# Patient Record
Sex: FEMALE | Race: WHITE | NOT HISPANIC OR LATINO
[De-identification: names, ages, dates, MRNs, and addresses within clinical notes are randomized per-mention and may not be internally consistent; named-entity substitution may affect disease eponyms.]

---

## 2017-05-30 ENCOUNTER — APPOINTMENT (OUTPATIENT)
Dept: ENDOCRINOLOGY | Facility: CLINIC | Age: 57
End: 2017-05-30

## 2017-06-20 ENCOUNTER — APPOINTMENT (OUTPATIENT)
Dept: GASTROENTEROLOGY | Facility: CLINIC | Age: 57
End: 2017-06-20

## 2017-06-20 VITALS
WEIGHT: 182 LBS | HEART RATE: 57 BPM | OXYGEN SATURATION: 98 % | DIASTOLIC BLOOD PRESSURE: 70 MMHG | HEIGHT: 66 IN | SYSTOLIC BLOOD PRESSURE: 114 MMHG | BODY MASS INDEX: 29.25 KG/M2 | RESPIRATION RATE: 14 BRPM

## 2017-06-20 DIAGNOSIS — R14.0 ABDOMINAL DISTENSION (GASEOUS): ICD-10-CM

## 2017-06-29 ENCOUNTER — APPOINTMENT (OUTPATIENT)
Dept: ULTRASOUND IMAGING | Facility: CLINIC | Age: 57
End: 2017-06-29

## 2017-07-16 ENCOUNTER — FORM ENCOUNTER (OUTPATIENT)
Age: 57
End: 2017-07-16

## 2017-07-17 ENCOUNTER — OUTPATIENT (OUTPATIENT)
Dept: OUTPATIENT SERVICES | Facility: HOSPITAL | Age: 57
LOS: 1 days | End: 2017-07-17
Payer: MEDICAID

## 2017-07-17 ENCOUNTER — APPOINTMENT (OUTPATIENT)
Dept: ULTRASOUND IMAGING | Facility: CLINIC | Age: 57
End: 2017-07-17

## 2017-07-17 DIAGNOSIS — Z90.13 ACQUIRED ABSENCE OF BILATERAL BREASTS AND NIPPLES: Chronic | ICD-10-CM

## 2017-07-17 DIAGNOSIS — Z98.89 OTHER SPECIFIED POSTPROCEDURAL STATES: Chronic | ICD-10-CM

## 2017-07-17 DIAGNOSIS — R14.0 ABDOMINAL DISTENSION (GASEOUS): ICD-10-CM

## 2017-07-17 DIAGNOSIS — Z90.49 ACQUIRED ABSENCE OF OTHER SPECIFIED PARTS OF DIGESTIVE TRACT: Chronic | ICD-10-CM

## 2017-07-17 PROCEDURE — 76700 US EXAM ABDOM COMPLETE: CPT

## 2017-07-19 ENCOUNTER — MESSAGE (OUTPATIENT)
Age: 57
End: 2017-07-19

## 2017-07-25 ENCOUNTER — APPOINTMENT (OUTPATIENT)
Dept: ENDOCRINOLOGY | Facility: CLINIC | Age: 57
End: 2017-07-25

## 2017-07-25 VITALS
HEIGHT: 66 IN | HEART RATE: 75 BPM | WEIGHT: 180 LBS | SYSTOLIC BLOOD PRESSURE: 120 MMHG | DIASTOLIC BLOOD PRESSURE: 80 MMHG | BODY MASS INDEX: 28.93 KG/M2 | OXYGEN SATURATION: 96 %

## 2017-07-25 RX ORDER — SODIUM SULFATE, POTASSIUM SULFATE, MAGNESIUM SULFATE 17.5; 3.13; 1.6 G/ML; G/ML; G/ML
17.5-3.13-1.6 SOLUTION, CONCENTRATE ORAL
Qty: 1 | Refills: 0 | Status: COMPLETED | COMMUNITY
Start: 2017-06-20 | End: 2017-07-25

## 2017-07-27 LAB
25(OH)D3 SERPL-MCNC: 22.2 NG/ML
ALBUMIN SERPL ELPH-MCNC: 4.5 G/DL
ALP BLD-CCNC: 73 U/L
ALT SERPL-CCNC: 7 U/L
ANION GAP SERPL CALC-SCNC: 17 MMOL/L
AST SERPL-CCNC: 16 U/L
BASOPHILS # BLD AUTO: 0.05 K/UL
BASOPHILS NFR BLD AUTO: 0.7 %
BILIRUB SERPL-MCNC: 0.5 MG/DL
BUN SERPL-MCNC: 14 MG/DL
CALCIUM SERPL-MCNC: 9.4 MG/DL
CHLORIDE SERPL-SCNC: 103 MMOL/L
CHOLEST SERPL-MCNC: 208 MG/DL
CHOLEST/HDLC SERPL: 3.4 RATIO
CO2 SERPL-SCNC: 22 MMOL/L
CREAT SERPL-MCNC: 0.8 MG/DL
EOSINOPHIL # BLD AUTO: 0.07 K/UL
EOSINOPHIL NFR BLD AUTO: 1 %
GLUCOSE SERPL-MCNC: 88 MG/DL
HBA1C MFR BLD HPLC: 5.2 %
HCT VFR BLD CALC: 42.2 %
HDLC SERPL-MCNC: 61 MG/DL
HGB BLD-MCNC: 13.5 G/DL
IMM GRANULOCYTES NFR BLD AUTO: 0.1 %
LDLC SERPL CALC-MCNC: 129 MG/DL
LYMPHOCYTES # BLD AUTO: 1.44 K/UL
LYMPHOCYTES NFR BLD AUTO: 21.3 %
MAN DIFF?: NORMAL
MCHC RBC-ENTMCNC: 28 PG
MCHC RBC-ENTMCNC: 32 GM/DL
MCV RBC AUTO: 87.6 FL
MONOCYTES # BLD AUTO: 0.41 K/UL
MONOCYTES NFR BLD AUTO: 6.1 %
NEUTROPHILS # BLD AUTO: 4.78 K/UL
NEUTROPHILS NFR BLD AUTO: 70.8 %
PLATELET # BLD AUTO: 263 K/UL
POTASSIUM SERPL-SCNC: 4.6 MMOL/L
PROT SERPL-MCNC: 6.7 G/DL
RBC # BLD: 4.82 M/UL
RBC # FLD: 13.8 %
SODIUM SERPL-SCNC: 142 MMOL/L
T4 FREE SERPL-MCNC: 1.3 NG/DL
THYROGLOB AB SERPL-ACNC: <20 IU/ML
THYROPEROXIDASE AB SERPL IA-ACNC: <10 IU/ML
TRIGL SERPL-MCNC: 88 MG/DL
TSH SERPL-ACNC: 1.81 UIU/ML
WBC # FLD AUTO: 6.76 K/UL

## 2017-07-31 LAB

## 2017-09-11 ENCOUNTER — MESSAGE (OUTPATIENT)
Age: 57
End: 2017-09-11

## 2017-09-11 DIAGNOSIS — R10.9 UNSPECIFIED ABDOMINAL PAIN: ICD-10-CM

## 2017-09-12 RX ORDER — POLYETHYLENE GLYCOL 3350 AND ELECTROLYTES WITH LEMON FLAVOR 236; 22.74; 6.74; 5.86; 2.97 G/4L; G/4L; G/4L; G/4L; G/4L
236 POWDER, FOR SOLUTION ORAL
Qty: 1 | Refills: 0 | Status: ACTIVE | COMMUNITY
Start: 2017-09-12 | End: 1900-01-01

## 2017-09-13 ENCOUNTER — APPOINTMENT (OUTPATIENT)
Dept: GASTROENTEROLOGY | Facility: AMBULATORY MEDICAL SERVICES | Age: 57
End: 2017-09-13
Payer: MEDICAID

## 2017-09-13 PROCEDURE — 43239 EGD BIOPSY SINGLE/MULTIPLE: CPT

## 2017-09-13 PROCEDURE — 45380 COLONOSCOPY AND BIOPSY: CPT

## 2017-10-18 ENCOUNTER — APPOINTMENT (OUTPATIENT)
Dept: OBGYN | Facility: CLINIC | Age: 57
End: 2017-10-18

## 2017-11-20 ENCOUNTER — APPOINTMENT (OUTPATIENT)
Dept: OBGYN | Facility: CLINIC | Age: 57
End: 2017-11-20

## 2018-01-02 ENCOUNTER — APPOINTMENT (OUTPATIENT)
Dept: ULTRASOUND IMAGING | Facility: CLINIC | Age: 58
End: 2018-01-02

## 2018-01-08 ENCOUNTER — APPOINTMENT (OUTPATIENT)
Dept: OTOLARYNGOLOGY | Facility: CLINIC | Age: 58
End: 2018-01-08

## 2018-01-16 ENCOUNTER — APPOINTMENT (OUTPATIENT)
Dept: OBGYN | Facility: CLINIC | Age: 58
End: 2018-01-16

## 2018-01-17 ENCOUNTER — APPOINTMENT (OUTPATIENT)
Dept: ULTRASOUND IMAGING | Facility: CLINIC | Age: 58
End: 2018-01-17
Payer: MEDICAID

## 2018-01-17 ENCOUNTER — APPOINTMENT (OUTPATIENT)
Dept: OBGYN | Facility: CLINIC | Age: 58
End: 2018-01-17

## 2018-01-17 ENCOUNTER — APPOINTMENT (OUTPATIENT)
Dept: OBGYN | Facility: CLINIC | Age: 58
End: 2018-01-17
Payer: MEDICAID

## 2018-01-17 ENCOUNTER — ASOB RESULT (OUTPATIENT)
Age: 58
End: 2018-01-17

## 2018-01-17 ENCOUNTER — OUTPATIENT (OUTPATIENT)
Dept: OUTPATIENT SERVICES | Facility: HOSPITAL | Age: 58
LOS: 1 days | End: 2018-01-17
Payer: MEDICAID

## 2018-01-17 VITALS
BODY MASS INDEX: 29.09 KG/M2 | SYSTOLIC BLOOD PRESSURE: 123 MMHG | WEIGHT: 181 LBS | DIASTOLIC BLOOD PRESSURE: 71 MMHG | HEIGHT: 66 IN | HEART RATE: 80 BPM

## 2018-01-17 DIAGNOSIS — Z90.13 ACQUIRED ABSENCE OF BILATERAL BREASTS AND NIPPLES: Chronic | ICD-10-CM

## 2018-01-17 DIAGNOSIS — Z98.89 OTHER SPECIFIED POSTPROCEDURAL STATES: Chronic | ICD-10-CM

## 2018-01-17 DIAGNOSIS — D05.10 INTRADUCTAL CARCINOMA IN SITU OF UNSPECIFIED BREAST: ICD-10-CM

## 2018-01-17 DIAGNOSIS — N95.2 POSTMENOPAUSAL ATROPHIC VAGINITIS: ICD-10-CM

## 2018-01-17 DIAGNOSIS — Z90.49 ACQUIRED ABSENCE OF OTHER SPECIFIED PARTS OF DIGESTIVE TRACT: Chronic | ICD-10-CM

## 2018-01-17 DIAGNOSIS — Z00.8 ENCOUNTER FOR OTHER GENERAL EXAMINATION: ICD-10-CM

## 2018-01-17 PROCEDURE — 76830 TRANSVAGINAL US NON-OB: CPT

## 2018-01-17 PROCEDURE — 76536 US EXAM OF HEAD AND NECK: CPT

## 2018-01-17 PROCEDURE — 76536 US EXAM OF HEAD AND NECK: CPT | Mod: 26

## 2018-01-17 PROCEDURE — 99386 PREV VISIT NEW AGE 40-64: CPT

## 2018-01-17 RX ORDER — LIDOCAINE HYDROCHLORIDE 20 MG/ML
2 SOLUTION OROPHARYNGEAL
Qty: 100 | Refills: 0 | Status: ACTIVE | COMMUNITY
Start: 2017-11-15

## 2018-01-17 RX ORDER — FLUCONAZOLE 100 MG/1
100 TABLET ORAL
Qty: 15 | Refills: 0 | Status: ACTIVE | COMMUNITY
Start: 2017-11-15

## 2018-01-18 ENCOUNTER — APPOINTMENT (OUTPATIENT)
Dept: OBGYN | Facility: CLINIC | Age: 58
End: 2018-01-18

## 2018-01-22 LAB
CYTOLOGY CVX/VAG DOC THIN PREP: NORMAL
HPV HIGH+LOW RISK DNA PNL CVX: NOT DETECTED

## 2018-01-23 ENCOUNTER — TRANSCRIPTION ENCOUNTER (OUTPATIENT)
Age: 58
End: 2018-01-23

## 2018-01-24 ENCOUNTER — OTHER (OUTPATIENT)
Age: 58
End: 2018-01-24

## 2018-04-10 ENCOUNTER — APPOINTMENT (OUTPATIENT)
Dept: OBGYN | Facility: CLINIC | Age: 58
End: 2018-04-10

## 2018-05-15 ENCOUNTER — APPOINTMENT (OUTPATIENT)
Dept: OBGYN | Facility: CLINIC | Age: 58
End: 2018-05-15

## 2018-06-19 ENCOUNTER — APPOINTMENT (OUTPATIENT)
Dept: ULTRASOUND IMAGING | Facility: CLINIC | Age: 58
End: 2018-06-19

## 2018-06-25 ENCOUNTER — APPOINTMENT (OUTPATIENT)
Dept: ULTRASOUND IMAGING | Facility: CLINIC | Age: 58
End: 2018-06-25

## 2018-07-30 PROBLEM — N95.2 POSTMENOPAUSAL ATROPHIC VAGINITIS: Status: ACTIVE | Noted: 2018-01-17

## 2018-08-03 ENCOUNTER — OUTPATIENT (OUTPATIENT)
Dept: OUTPATIENT SERVICES | Facility: HOSPITAL | Age: 58
LOS: 1 days | End: 2018-08-03
Payer: MEDICAID

## 2018-08-03 ENCOUNTER — APPOINTMENT (OUTPATIENT)
Dept: ULTRASOUND IMAGING | Facility: CLINIC | Age: 58
End: 2018-08-03
Payer: MEDICAID

## 2018-08-03 DIAGNOSIS — Z98.89 OTHER SPECIFIED POSTPROCEDURAL STATES: Chronic | ICD-10-CM

## 2018-08-03 DIAGNOSIS — Z00.8 ENCOUNTER FOR OTHER GENERAL EXAMINATION: ICD-10-CM

## 2018-08-03 DIAGNOSIS — Z90.13 ACQUIRED ABSENCE OF BILATERAL BREASTS AND NIPPLES: Chronic | ICD-10-CM

## 2018-08-03 DIAGNOSIS — Z90.49 ACQUIRED ABSENCE OF OTHER SPECIFIED PARTS OF DIGESTIVE TRACT: Chronic | ICD-10-CM

## 2018-08-03 PROCEDURE — 76700 US EXAM ABDOM COMPLETE: CPT | Mod: 26

## 2018-08-03 PROCEDURE — 76700 US EXAM ABDOM COMPLETE: CPT

## 2018-08-20 ENCOUNTER — APPOINTMENT (OUTPATIENT)
Dept: ENDOCRINOLOGY | Facility: CLINIC | Age: 58
End: 2018-08-20
Payer: MEDICAID

## 2018-08-20 ENCOUNTER — LABORATORY RESULT (OUTPATIENT)
Age: 58
End: 2018-08-20

## 2018-08-20 VITALS
SYSTOLIC BLOOD PRESSURE: 120 MMHG | DIASTOLIC BLOOD PRESSURE: 78 MMHG | OXYGEN SATURATION: 99 % | HEART RATE: 68 BPM | HEIGHT: 66 IN | RESPIRATION RATE: 16 BRPM

## 2018-08-20 PROCEDURE — 99214 OFFICE O/P EST MOD 30 MIN: CPT | Mod: 25

## 2018-08-20 PROCEDURE — 76536 US EXAM OF HEAD AND NECK: CPT

## 2018-08-21 LAB
25(OH)D3 SERPL-MCNC: 31.7 NG/ML
ALBUMIN SERPL ELPH-MCNC: 4.5 G/DL
ALP BLD-CCNC: 78 U/L
ALT SERPL-CCNC: 11 U/L
ANION GAP SERPL CALC-SCNC: 14 MMOL/L
AST SERPL-CCNC: 21 U/L
BASOPHILS # BLD AUTO: 0.04 K/UL
BASOPHILS NFR BLD AUTO: 0.6 %
BILIRUB SERPL-MCNC: 0.5 MG/DL
BUN SERPL-MCNC: 15 MG/DL
CALCIUM SERPL-MCNC: 9.4 MG/DL
CHLORIDE SERPL-SCNC: 100 MMOL/L
CHOLEST SERPL-MCNC: 221 MG/DL
CHOLEST/HDLC SERPL: 3.6 RATIO
CO2 SERPL-SCNC: 25 MMOL/L
CREAT SERPL-MCNC: 0.7 MG/DL
EOSINOPHIL # BLD AUTO: 0.08 K/UL
EOSINOPHIL NFR BLD AUTO: 1.2 %
GLUCOSE SERPL-MCNC: 87 MG/DL
HBA1C MFR BLD HPLC: 5.5 %
HCT VFR BLD CALC: 43.4 %
HDLC SERPL-MCNC: 62 MG/DL
HGB BLD-MCNC: 14.1 G/DL
IMM GRANULOCYTES NFR BLD AUTO: 0.3 %
LDLC SERPL CALC-MCNC: 138 MG/DL
LYMPHOCYTES # BLD AUTO: 1.43 K/UL
LYMPHOCYTES NFR BLD AUTO: 20.8 %
MAN DIFF?: NORMAL
MCHC RBC-ENTMCNC: 28.2 PG
MCHC RBC-ENTMCNC: 32.5 GM/DL
MCV RBC AUTO: 86.8 FL
MONOCYTES # BLD AUTO: 0.34 K/UL
MONOCYTES NFR BLD AUTO: 5 %
NEUTROPHILS # BLD AUTO: 4.95 K/UL
NEUTROPHILS NFR BLD AUTO: 72.1 %
PLATELET # BLD AUTO: 241 K/UL
POTASSIUM SERPL-SCNC: 4.3 MMOL/L
PROT SERPL-MCNC: 7.1 G/DL
RBC # BLD: 5 M/UL
RBC # FLD: 13.8 %
SODIUM SERPL-SCNC: 139 MMOL/L
T4 FREE SERPL-MCNC: 1.4 NG/DL
TRIGL SERPL-MCNC: 107 MG/DL
TSH SERPL-ACNC: 1.69 UIU/ML
WBC # FLD AUTO: 6.86 K/UL

## 2018-08-22 ENCOUNTER — APPOINTMENT (OUTPATIENT)
Dept: UROLOGY | Facility: CLINIC | Age: 58
End: 2018-08-22

## 2018-08-28 ENCOUNTER — APPOINTMENT (OUTPATIENT)
Dept: GASTROENTEROLOGY | Facility: CLINIC | Age: 58
End: 2018-08-28

## 2018-11-05 ENCOUNTER — APPOINTMENT (OUTPATIENT)
Dept: ENDOCRINOLOGY | Facility: CLINIC | Age: 58
End: 2018-11-05
Payer: MEDICAID

## 2018-11-05 VITALS — HEART RATE: 87 BPM | DIASTOLIC BLOOD PRESSURE: 80 MMHG | OXYGEN SATURATION: 98 % | SYSTOLIC BLOOD PRESSURE: 120 MMHG

## 2018-11-05 DIAGNOSIS — M54.2 CERVICALGIA: ICD-10-CM

## 2018-11-05 PROCEDURE — 99214 OFFICE O/P EST MOD 30 MIN: CPT | Mod: 25

## 2018-11-05 PROCEDURE — 76536 US EXAM OF HEAD AND NECK: CPT

## 2018-11-19 ENCOUNTER — APPOINTMENT (OUTPATIENT)
Dept: UROLOGY | Facility: CLINIC | Age: 58
End: 2018-11-19

## 2018-12-12 ENCOUNTER — APPOINTMENT (OUTPATIENT)
Dept: UROLOGY | Facility: CLINIC | Age: 58
End: 2018-12-12

## 2019-02-13 ENCOUNTER — APPOINTMENT (OUTPATIENT)
Dept: UROLOGY | Facility: CLINIC | Age: 59
End: 2019-02-13

## 2019-04-09 ENCOUNTER — RESULT REVIEW (OUTPATIENT)
Age: 59
End: 2019-04-09

## 2019-05-20 ENCOUNTER — APPOINTMENT (OUTPATIENT)
Dept: ENDOCRINOLOGY | Facility: CLINIC | Age: 59
End: 2019-05-20
Payer: MEDICAID

## 2019-05-20 VITALS
OXYGEN SATURATION: 97 % | WEIGHT: 181 LBS | DIASTOLIC BLOOD PRESSURE: 70 MMHG | BODY MASS INDEX: 29.09 KG/M2 | HEIGHT: 66 IN | HEART RATE: 84 BPM | SYSTOLIC BLOOD PRESSURE: 120 MMHG

## 2019-05-20 PROCEDURE — 76536 US EXAM OF HEAD AND NECK: CPT

## 2019-05-20 PROCEDURE — 99213 OFFICE O/P EST LOW 20 MIN: CPT | Mod: 25

## 2019-05-20 NOTE — ASSESSMENT
[FreeTextEntry1] : 58 y/o F with \par \par 1. Multinodular Goiter with dominant left-isthmus thyroid nodule. Thyroid U/S performed today stable (see results section for full description). If previously biopsied and now stable can continue to monitor with serial thyroid U/S. Repeat thyroid function tests. Pt. clinically euthyroid. \par \par 2. Vit D Deficiency- repeat Vit D level. Goal >30.

## 2019-05-20 NOTE — PHYSICAL EXAM
[Alert] : alert [No Acute Distress] : no acute distress [Well Nourished] : well nourished [Well Developed] : well developed [Normal Sclera/Conjunctiva] : normal sclera/conjunctiva [EOMI] : extra ocular movement intact [Normal Oropharynx] : the oropharynx was normal [Supple] : the neck was supple [Thyroid Not Enlarged] : the thyroid was not enlarged [No Respiratory Distress] : no respiratory distress [Normal Rate and Effort] : normal respiratory rhythm and effort [No Accessory Muscle Use] : no accessory muscle use [Clear to Auscultation] : lungs were clear to auscultation bilaterally [Normal Rate] : heart rate was normal  [Normal S1, S2] : normal S1 and S2 [Regular Rhythm] : with a regular rhythm [No Edema] : there was no peripheral edema [Normal Bowel Sounds] : normal bowel sounds [Not Tender] : non-tender [Soft] : abdomen soft [Not Distended] : not distended [Post Cervical Nodes] : posterior cervical nodes [Anterior Cervical Nodes] : anterior cervical nodes [No Spinal Tenderness] : no spinal tenderness [No Stigmata of Cushings Syndrome] : no stigmata of cushings syndrome [Normal Gait] : normal gait [No Motor Deficits] : the motor exam was normal [No Tremors] : no tremors [Oriented x3] : oriented to person, place, and time [Normal Affect] : the affect was normal [Normal Mood] : the mood was normal [Kyphosis] : no kyphosis present [Acanthosis Nigricans] : no acanthosis nigricans [de-identified] : + mid thyroid nodule with swallowing [de-identified] : normal

## 2019-05-20 NOTE — ASSESSMENT
[FreeTextEntry1] : 60 y/o F with \par \par 1. Multinodular Goiter with dominant left-isthmus thyroid nodule. Thyroid U/S performed today stable (see results section for full description). If previously biopsied and now stable can continue to monitor with serial thyroid U/S. Repeat thyroid function tests. Pt. clinically euthyroid. \par \par 2. Vit D Deficiency- repeat Vit D level. Goal >30.

## 2019-05-20 NOTE — PHYSICAL EXAM
[Alert] : alert [No Acute Distress] : no acute distress [Well Nourished] : well nourished [Well Developed] : well developed [Normal Sclera/Conjunctiva] : normal sclera/conjunctiva [EOMI] : extra ocular movement intact [Normal Oropharynx] : the oropharynx was normal [Supple] : the neck was supple [Thyroid Not Enlarged] : the thyroid was not enlarged [No Respiratory Distress] : no respiratory distress [Normal Rate and Effort] : normal respiratory rhythm and effort [No Accessory Muscle Use] : no accessory muscle use [Clear to Auscultation] : lungs were clear to auscultation bilaterally [Normal Rate] : heart rate was normal  [Normal S1, S2] : normal S1 and S2 [Regular Rhythm] : with a regular rhythm [No Edema] : there was no peripheral edema [Normal Bowel Sounds] : normal bowel sounds [Not Tender] : non-tender [Soft] : abdomen soft [Not Distended] : not distended [Post Cervical Nodes] : posterior cervical nodes [Anterior Cervical Nodes] : anterior cervical nodes [No Spinal Tenderness] : no spinal tenderness [No Stigmata of Cushings Syndrome] : no stigmata of cushings syndrome [Normal Gait] : normal gait [No Motor Deficits] : the motor exam was normal [No Tremors] : no tremors [Oriented x3] : oriented to person, place, and time [Normal Affect] : the affect was normal [Normal Mood] : the mood was normal [Kyphosis] : no kyphosis present [Acanthosis Nigricans] : no acanthosis nigricans [de-identified] : + mid thyroid nodule with swallowing [de-identified] : normal

## 2019-05-20 NOTE — DATA REVIEWED
[FreeTextEntry1] : Thyroid Ultrasound Report 5/20/19:\par \par Technique: multiple real time longitudinal and transverse images were obtained using a high resolution ultrasound with a linear transducer, [employer] LOGIQ e 2008 model, 10-12 MHz frequencies. All measurements will be reported as longitudinal x whitney-posterior x transverse. \par Comparison: Report dated 11/2018. \par \par Indication: thyroid nodule and neck pain. \par \par Findings: \par The right thyroid lobe measures 5.43 x 1.53 x 1.64 cm. The left thyroid lobe measures 4.89 x 1.27 x 1.25 cm. The isthmus measures 0.60 cm. \par The right thyroid lobe has a homogenous parenchyma. \par The left thyroid lobe has a homogeneous parenchyma . \par  \par In the isthmus there is a  lower pole there is a  mixed, with isoechoic solid component. It measures 1.20 x 1.47 x 1.03 cm. The nodule is ovoid in shape and the border is smooth. The nodule does not have a halo. It demonstrates no calcification. It has no vascularity. \par \par In the left cyst. It measures 0.3 x 0.33 x 0.33 cm. The nodule is round in shape and the border is smooth. The nodule does not have a halo. It demonstrates no calcifications. It has no vascularity. \par \par In the isthmus there is a  hypoechoic nodule, predominantly solid. It measures 0.67 x 0.65 x 0.5 cm. The nodule is ovoid in shape and the border is smooth. The nodule has a halo. It demonstrates no calcifications. It has no vascularity. \par \par Multiple subcentimeter thyroid cysts on the right \par \par \par Thyroid Ultrasound Report 11/5/18:\par \par Technique: multiple real time longitudinal and transverse images were obtained using a high resolution ultrasound with a linear transducer, [employer] e 2008 model, 10-12 MHz frequencies. All measurements will be reported as longitudinal x whitney-posterior x transverse. \par Comparison: Report dated 8/2018. \par \par Indication: thyroid nodule. \par \par Findings: \par The right thyroid lobe measures 4.92 x 1.64 x 1.97 cm. The left thyroid lobe measures 4.22 x 1.23 x 132 cm. The isthmus measures 0.55 cm. \par The right thyroid lobe has a homogenous parenchyma. \par The left thyroid lobe has a homogeneous parenchyma . \par  \par In the isthmus there is a  lower pole there is a  mixed, with isoechoic solid component. It measures 1.28 x 1.22 x 1.21 cm. The nodule is ovoid in shape and the border is smooth. The nodule does not have a halo. It demonstrates no calcification. It has no vascularity. \par \par In the left cyst. It measures 0.37 x 0.32 x 0.34 cm. The nodule is round in shape and the border is smooth. The nodule does not have a halo. It demonstrates no calcifications. It has no vascularity. \par \par In the isthmus there is a  hypoechoic nodule, predominantly solid. It measures 0.64 x 0.63 x 0.48 cm. The nodule is ovoid in shape and the border is smooth. The nodule has a halo. It demonstrates no calcifications. It has no vascularity. \par \par Multiple subcentimeter thyroid cysts on the right \par \par Labs 8/2018:\par CMP normal\par \par HDL 62\par Chol 221\par Trig 107\par Vit D 31.7\par TSH 1.69\par A1c 5.5%\par \par Thyroid Ultrasound Report 8/2018:\par \par Technique: multiple real time longitudinal and transverse images were obtained using a high resolution ultrasound with a linear transducer, [employer] e 2008 model, 10-12 MHz frequencies. All measurements will be reported as longitudinal x whitney-posterior x transverse. \par Comparison: Report dated 1/2018. \par \par Indication: thyroid nodule. \par \par Findings: \par The right thyroid lobe measures 4.38 x 1.3 x 1.8 cm. The left thyroid lobe measures 3.86 x 1.12 x 1.5 cm. The isthmus measures 0.45 cm. \par The right thyroid lobe has a homogenous parenchyma. \par The left thyroid lobe has a homogeneous parenchyma . \par  \par In the isthmus there is a  lower pole there is a  mixed, with isoechoic solid component. It measures 1.28 x 1.22 x 1.21 cm. The nodule is ovoid in shape and the border is smooth. The nodule does not have a halo. It demonstrates no calcification. It has no vascularity. \par \par In the left cyst. It measures 0.37 x 0.32 x 0.34 cm. The nodule is round in shape and the border is smooth. The nodule does not have a halo. It demonstrates no calcifications. It has no vascularity. \par \par In the isthmus there is a  hypoechoic nodule, predominantly solid. It measures 0.64 x 0.63 x 0.48 cm. The nodule is ovoid in shape and the border is smooth. The nodule has a halo. It demonstrates no calcifications. It has no vascularity. \par \par \par Thyroid US 1/2018: Stable isthmus nodule 1.3 cm\par \par Labs 7/2017:\par TSH 1.81\par Free T4 1.3\par TPO Ab neg.\par Vit D 22.2\par CMP normal\par \par HDL 61\par Chol 208\par Trig 88\par A1c 5.2%\par \par Thyroid Ultrasound Report 7/25/17:\par \par Technique: multiple real time longitudinal and transverse images were obtained using a high resolution ultrasound with a linear transducer, [employer] e 2008 model, 10-12 MHz frequencies. All measurements will be reported as longitudinal x whitney-posterior x transverse. \par Comparison: Report dated 11/2015. \par \par Indication: thyroid nodule. \par \par Findings: \par The right thyroid lobe measures 4.38 x 1.25 x 1.73 cm. The left thyroid lobe measures 3.86 x 1.11 x 1.44 cm. The isthmus measures 0.46 cm. \par The right thyroid lobe has a homogenous parenchyma. \par The left thyroid lobe has a homogeneous parenchyma . \par  \par In the isthmus there is a  lower pole there is a  mixed, with isoechoic solid component. It measures 1.11 x 1.20 x 0.88 cm. The nodule is ovoid in shape and the border is smooth. The nodule does not have a halo. It demonstrates no calcification. It has no vascularity. \par \par In the left cyst. It measures 0.37 x 0.32 x 0.34 cm. The nodule is round in shape and the border is smooth. The nodule does not have a halo. It demonstrates no calcifications. It has no vascularity. \par \par In the isthmus there is a  hypoechoic nodule, predominantly solid. It measures 0.64 x 0.63 x 0.48 cm. The nodule is ovoid in shape and the border is smooth. The nodule has a halo. It demonstrates no calcifications. It has no vascularity. \par \par \par Multiple subcentimeter thyroid cysts on the right \par \par Thyroid Ultrasound Report 9/27/16:\par \par Technique: multiple real time longitudinal and transverse images were obtained using a high resolution ultrasound with a linear transducer, Flin[employer] e 2008 model, 10-12 MHz frequencies. All measurements will be reported as longitudinal x whitney-posterior x transverse. \par Comparison: Report dated 11/2015. \par \par Indication: thyroid nodule. \par \par Findings: \par The right thyroid lobe measures 3.82 x 1.44 x 1.52 cm. The left thyroid lobe measures 4.2 x 1.2 x 1.38 cm. The isthmus measures 0.55 cm. \par The right thyroid lobe has a homogenous parenchyma. \par The left thyroid lobe has a homogeneous parenchyma . \par  \par In the isthmus there is a  mixed, with isoechoic solid component. It measures 1.0 x 0.8 x 1.25 cm. The nodule is ovoid in shape and the border is smooth. The nodule does not have a halo. It demonstrates no calcification. It has no vascularity. \par \par In the left cyst. It measures 0.37 x 0.32 x 0.34 cm. The nodule is round in shape and the border is smooth. The nodule does not have a halo. It demonstrates no calcifications. It has no vascularity. \par \par In the isthmus there is a  predominantly solid. It measures 0.8 x 0.43 x 0.82 cm. The nodule is ovoid in shape and the border is smooth. The nodule has a halo. It demonstrates no calcifications. It has no vascularity. \par \par \par Labs 11/24/15:\par A1c 5.3%\par Vit D 18.9\par TSH 1.51\par Anti thyroid Ab neg.\par \par HDL 63\par Chol 214\par Trig 102\par CMP normal except glucose of 64\par \par Thyroid Ultrasound Report 11/24/15:\par \par Technique: multiple real time longitudinal and transverse images were obtained using a high resolution ultrasound with a linear transducer, Flin[employer] e 2008 model, 10-12 MHz frequencies. All measurements will be reported as longitudinal x whitney-posterior x transverse. \par Comparison: Report dated 8/2015. \par \par Indication: thyroid nodule. \par \par Findings: \par The right thyroid lobe measures 4.08 x 1.42 x 1.72 cm. The left thyroid lobe measures 4.47 x 1.14 x 1.55 cm. The isthmus measures 0.62 cm. \par The right thyroid lobe has a homogenous parenchyma. \par The left thyroid lobe has a homogeneous parenchyma . \par  \par In the isthmus there is a  mixed, with isoechoic solid component. It measures 1.3 x 1.07 x 0.83 cm. The nodule is ovoid in shape and the border is smooth. The nodule does not have a halo. It demonstrates no calcification. It has no vascularity. \par \par In the left cyst. It measures 0.42 x 0.38 x 0.39 cm. The nodule is round in shape and the border is smooth. The nodule does not have a halo. It demonstrates no calcifications. It has no vascularity. \par \par Thyroid U/S 8/2015: 1.2 cm left isthmus thyroid nodule and 0.4 cm left lower thyroid nodule\par \par Labs 6/22/15:\par CBC normal\par CMP normal except glucose of 145\par Chol 214\par HDL 61\par \par Trig 78\par TSH 1.83\par Vit D 23.1

## 2019-05-20 NOTE — PROCEDURE
[[employer] e 2008 model, 10-12 MHz frequencies] : multiple real time longitudinal and transverse images were obtained using a high resolution ultrasound with a linear transducer, [employer] e 2008 model, 10-12 MHz frequencies. All measurements will be reported as longitudinal x whitney-posterior x transverse. [Report dated ___] : Report dated [unfilled] [Thyroid Nodule] : thyroid nodule [Neck Pain] : neck pain [] : a homogeneous parenchyma  [Lower] : lower pole there is a  [Mixed] : mixed [Isoechoic solid component] : with isoechoic solid component [No] : does not have a halo [No calcification] : no calcification [Left Thyroid] : left [Cyst] : cyst [Round] : round in shape [Isthmus] : isthmus there is a  [Hypoechoic] : hypoechoic nodule [Predominantly Solid] : predominantly solid [Ovoid] : ovoid in shape [Smooth] : smooth [Yes] : has a halo [No calcifications] : no calcifications [No vascularity] : no vascularity [FreeTextEntry1] : 5.43 x 1.53 x 1.64 [FreeTextEntry5] : 4.89 x 1.27 x 1.25 [FreeTextEntry2] : 0.60 [FreeTextEntry3] : 0.67 x 0.65 x 0.5 [de-identified] : Multiple subcentimeter thyroid cysts on the right

## 2019-05-20 NOTE — REVIEW OF SYSTEMS
[Fatigue] : fatigue [All other systems negative] : All other systems negative [Recent Weight Gain (___ Lbs)] : no recent weight gain [Recent Weight Loss (___ Lbs)] : no recent weight loss [Blurry Vision] : no blurred vision [Dysphagia] : no dysphagia [Dysphonia] : no dysphonia [Chest Pain] : no chest pain [Palpitations] : no palpitations [Shortness Of Breath] : no shortness of breath [Wheezing] : no wheezing was heard [Nausea] : no nausea [Vomiting] : no vomiting was observed [Polyuria] : no polyuria [Muscle Weakness] : no muscle weakness [Hair Loss] : no hair loss [Cold Intolerance] : cold tolerant [Heat Intolerance] : heat tolerant

## 2019-05-20 NOTE — DATA REVIEWED
[FreeTextEntry1] : Thyroid Ultrasound Report 5/20/19:\par \par Technique: multiple real time longitudinal and transverse images were obtained using a high resolution ultrasound with a linear transducer, [employer] LOGIQ e 2008 model, 10-12 MHz frequencies. All measurements will be reported as longitudinal x whitney-posterior x transverse. \par Comparison: Report dated 11/2018. \par \par Indication: thyroid nodule and neck pain. \par \par Findings: \par The right thyroid lobe measures 5.43 x 1.53 x 1.64 cm. The left thyroid lobe measures 4.89 x 1.27 x 1.25 cm. The isthmus measures 0.60 cm. \par The right thyroid lobe has a homogenous parenchyma. \par The left thyroid lobe has a homogeneous parenchyma . \par  \par In the isthmus there is a  lower pole there is a  mixed, with isoechoic solid component. It measures 1.20 x 1.47 x 1.03 cm. The nodule is ovoid in shape and the border is smooth. The nodule does not have a halo. It demonstrates no calcification. It has no vascularity. \par \par In the left cyst. It measures 0.3 x 0.33 x 0.33 cm. The nodule is round in shape and the border is smooth. The nodule does not have a halo. It demonstrates no calcifications. It has no vascularity. \par \par In the isthmus there is a  hypoechoic nodule, predominantly solid. It measures 0.67 x 0.65 x 0.5 cm. The nodule is ovoid in shape and the border is smooth. The nodule has a halo. It demonstrates no calcifications. It has no vascularity. \par \par Multiple subcentimeter thyroid cysts on the right \par \par \par Thyroid Ultrasound Report 11/5/18:\par \par Technique: multiple real time longitudinal and transverse images were obtained using a high resolution ultrasound with a linear transducer, [employer] e 2008 model, 10-12 MHz frequencies. All measurements will be reported as longitudinal x whitney-posterior x transverse. \par Comparison: Report dated 8/2018. \par \par Indication: thyroid nodule. \par \par Findings: \par The right thyroid lobe measures 4.92 x 1.64 x 1.97 cm. The left thyroid lobe measures 4.22 x 1.23 x 132 cm. The isthmus measures 0.55 cm. \par The right thyroid lobe has a homogenous parenchyma. \par The left thyroid lobe has a homogeneous parenchyma . \par  \par In the isthmus there is a  lower pole there is a  mixed, with isoechoic solid component. It measures 1.28 x 1.22 x 1.21 cm. The nodule is ovoid in shape and the border is smooth. The nodule does not have a halo. It demonstrates no calcification. It has no vascularity. \par \par In the left cyst. It measures 0.37 x 0.32 x 0.34 cm. The nodule is round in shape and the border is smooth. The nodule does not have a halo. It demonstrates no calcifications. It has no vascularity. \par \par In the isthmus there is a  hypoechoic nodule, predominantly solid. It measures 0.64 x 0.63 x 0.48 cm. The nodule is ovoid in shape and the border is smooth. The nodule has a halo. It demonstrates no calcifications. It has no vascularity. \par \par Multiple subcentimeter thyroid cysts on the right \par \par Labs 8/2018:\par CMP normal\par \par HDL 62\par Chol 221\par Trig 107\par Vit D 31.7\par TSH 1.69\par A1c 5.5%\par \par Thyroid Ultrasound Report 8/2018:\par \par Technique: multiple real time longitudinal and transverse images were obtained using a high resolution ultrasound with a linear transducer, [employer] e 2008 model, 10-12 MHz frequencies. All measurements will be reported as longitudinal x whitney-posterior x transverse. \par Comparison: Report dated 1/2018. \par \par Indication: thyroid nodule. \par \par Findings: \par The right thyroid lobe measures 4.38 x 1.3 x 1.8 cm. The left thyroid lobe measures 3.86 x 1.12 x 1.5 cm. The isthmus measures 0.45 cm. \par The right thyroid lobe has a homogenous parenchyma. \par The left thyroid lobe has a homogeneous parenchyma . \par  \par In the isthmus there is a  lower pole there is a  mixed, with isoechoic solid component. It measures 1.28 x 1.22 x 1.21 cm. The nodule is ovoid in shape and the border is smooth. The nodule does not have a halo. It demonstrates no calcification. It has no vascularity. \par \par In the left cyst. It measures 0.37 x 0.32 x 0.34 cm. The nodule is round in shape and the border is smooth. The nodule does not have a halo. It demonstrates no calcifications. It has no vascularity. \par \par In the isthmus there is a  hypoechoic nodule, predominantly solid. It measures 0.64 x 0.63 x 0.48 cm. The nodule is ovoid in shape and the border is smooth. The nodule has a halo. It demonstrates no calcifications. It has no vascularity. \par \par \par Thyroid US 1/2018: Stable isthmus nodule 1.3 cm\par \par Labs 7/2017:\par TSH 1.81\par Free T4 1.3\par TPO Ab neg.\par Vit D 22.2\par CMP normal\par \par HDL 61\par Chol 208\par Trig 88\par A1c 5.2%\par \par Thyroid Ultrasound Report 7/25/17:\par \par Technique: multiple real time longitudinal and transverse images were obtained using a high resolution ultrasound with a linear transducer, [employer] e 2008 model, 10-12 MHz frequencies. All measurements will be reported as longitudinal x whitney-posterior x transverse. \par Comparison: Report dated 11/2015. \par \par Indication: thyroid nodule. \par \par Findings: \par The right thyroid lobe measures 4.38 x 1.25 x 1.73 cm. The left thyroid lobe measures 3.86 x 1.11 x 1.44 cm. The isthmus measures 0.46 cm. \par The right thyroid lobe has a homogenous parenchyma. \par The left thyroid lobe has a homogeneous parenchyma . \par  \par In the isthmus there is a  lower pole there is a  mixed, with isoechoic solid component. It measures 1.11 x 1.20 x 0.88 cm. The nodule is ovoid in shape and the border is smooth. The nodule does not have a halo. It demonstrates no calcification. It has no vascularity. \par \par In the left cyst. It measures 0.37 x 0.32 x 0.34 cm. The nodule is round in shape and the border is smooth. The nodule does not have a halo. It demonstrates no calcifications. It has no vascularity. \par \par In the isthmus there is a  hypoechoic nodule, predominantly solid. It measures 0.64 x 0.63 x 0.48 cm. The nodule is ovoid in shape and the border is smooth. The nodule has a halo. It demonstrates no calcifications. It has no vascularity. \par \par \par Multiple subcentimeter thyroid cysts on the right \par \par Thyroid Ultrasound Report 9/27/16:\par \par Technique: multiple real time longitudinal and transverse images were obtained using a high resolution ultrasound with a linear transducer, [employer] e 2008 model, 10-12 MHz frequencies. All measurements will be reported as longitudinal x whitney-posterior x transverse. \par Comparison: Report dated 11/2015. \par \par Indication: thyroid nodule. \par \par Findings: \par The right thyroid lobe measures 3.82 x 1.44 x 1.52 cm. The left thyroid lobe measures 4.2 x 1.2 x 1.38 cm. The isthmus measures 0.55 cm. \par The right thyroid lobe has a homogenous parenchyma. \par The left thyroid lobe has a homogeneous parenchyma . \par  \par In the isthmus there is a  mixed, with isoechoic solid component. It measures 1.0 x 0.8 x 1.25 cm. The nodule is ovoid in shape and the border is smooth. The nodule does not have a halo. It demonstrates no calcification. It has no vascularity. \par \par In the left cyst. It measures 0.37 x 0.32 x 0.34 cm. The nodule is round in shape and the border is smooth. The nodule does not have a halo. It demonstrates no calcifications. It has no vascularity. \par \par In the isthmus there is a  predominantly solid. It measures 0.8 x 0.43 x 0.82 cm. The nodule is ovoid in shape and the border is smooth. The nodule has a halo. It demonstrates no calcifications. It has no vascularity. \par \par \par Labs 11/24/15:\par A1c 5.3%\par Vit D 18.9\par TSH 1.51\par Anti thyroid Ab neg.\par \par HDL 63\par Chol 214\par Trig 102\par CMP normal except glucose of 64\par \par Thyroid Ultrasound Report 11/24/15:\par \par Technique: multiple real time longitudinal and transverse images were obtained using a high resolution ultrasound with a linear transducer, [employer] e 2008 model, 10-12 MHz frequencies. All measurements will be reported as longitudinal x whitney-posterior x transverse. \par Comparison: Report dated 8/2015. \par \par Indication: thyroid nodule. \par \par Findings: \par The right thyroid lobe measures 4.08 x 1.42 x 1.72 cm. The left thyroid lobe measures 4.47 x 1.14 x 1.55 cm. The isthmus measures 0.62 cm. \par The right thyroid lobe has a homogenous parenchyma. \par The left thyroid lobe has a homogeneous parenchyma . \par  \par In the isthmus there is a  mixed, with isoechoic solid component. It measures 1.3 x 1.07 x 0.83 cm. The nodule is ovoid in shape and the border is smooth. The nodule does not have a halo. It demonstrates no calcification. It has no vascularity. \par \par In the left cyst. It measures 0.42 x 0.38 x 0.39 cm. The nodule is round in shape and the border is smooth. The nodule does not have a halo. It demonstrates no calcifications. It has no vascularity. \par \par Thyroid U/S 8/2015: 1.2 cm left isthmus thyroid nodule and 0.4 cm left lower thyroid nodule\par \par Labs 6/22/15:\par CBC normal\par CMP normal except glucose of 145\par Chol 214\par HDL 61\par \par Trig 78\par TSH 1.83\par Vit D 23.1

## 2019-05-20 NOTE — IMPRESSION
[FreeTextEntry1] : Stable multinodular goiter as described above with dominant left-isthmus nodule. [FreeTextEntry2] : Check thyroid function tests.\par Obtain prior biopsy. If not previously biopsied then will perform FNA.\par Repeat thyroid U/S in 12-24 months.

## 2019-05-20 NOTE — HISTORY OF PRESENT ILLNESS
[FreeTextEntry1] : 60 y/o F diagnosed with left-isthmus thyroid nodule diagnosed in 2012 was being followed previously by Dr. Mejia with serial thyroid U/S unchanged and chemically euthyroid never on thyroid hormone replacement. Denies any hx of radiation exposure. Never on lithium or amiodarone. No dysphagia or dysphonia. Denies symptoms of hyper or hypothyroidism. Reports this nodule was previously biopsied and benign. Seen by me 11/2015. Thyroid U/S at that time revealed an isthmus nodule mixed, with isoechoic solid component measuring 1.3 x 1.07 x 0.83 cm and a left cyst measuring 0.42 x 0.38 x 0.39 cm. Last thyroid US 1/2018 with stable isthmus nodule at 1.3 cm. Had bilateral mastectomy with breast implants s/p rupture and repeat surgery. Also has noticed feeling inflammation and nodules in the bottom of her neck especially on left. Also noticed palpitations. Hair loss has improved. + fatigue and posterior neck pain.

## 2019-05-20 NOTE — HISTORY OF PRESENT ILLNESS
[FreeTextEntry1] : 58 y/o F diagnosed with left-isthmus thyroid nodule diagnosed in 2012 was being followed previously by Dr. Mejia with serial thyroid U/S unchanged and chemically euthyroid never on thyroid hormone replacement. Denies any hx of radiation exposure. Never on lithium or amiodarone. No dysphagia or dysphonia. Denies symptoms of hyper or hypothyroidism. Reports this nodule was previously biopsied and benign. Seen by me 11/2015. Thyroid U/S at that time revealed an isthmus nodule mixed, with isoechoic solid component measuring 1.3 x 1.07 x 0.83 cm and a left cyst measuring 0.42 x 0.38 x 0.39 cm. Last thyroid US 1/2018 with stable isthmus nodule at 1.3 cm. Had bilateral mastectomy with breast implants s/p rupture and repeat surgery. Also has noticed feeling inflammation and nodules in the bottom of her neck especially on left. Also noticed palpitations. Hair loss has improved. + fatigue and posterior neck pain.

## 2019-05-20 NOTE — PROCEDURE
[[employer] e 2008 model, 10-12 MHz frequencies] : multiple real time longitudinal and transverse images were obtained using a high resolution ultrasound with a linear transducer, [employer] e 2008 model, 10-12 MHz frequencies. All measurements will be reported as longitudinal x whitney-posterior x transverse. [Report dated ___] : Report dated [unfilled] [Thyroid Nodule] : thyroid nodule [Neck Pain] : neck pain [] : a homogeneous parenchyma  [Lower] : lower pole there is a  [Mixed] : mixed [Isoechoic solid component] : with isoechoic solid component [No] : does not have a halo [No calcification] : no calcification [Left Thyroid] : left [Cyst] : cyst [Round] : round in shape [Isthmus] : isthmus there is a  [Hypoechoic] : hypoechoic nodule [Predominantly Solid] : predominantly solid [Ovoid] : ovoid in shape [Smooth] : smooth [Yes] : has a halo [No calcifications] : no calcifications [No vascularity] : no vascularity [FreeTextEntry1] : 5.43 x 1.53 x 1.64 [FreeTextEntry5] : 4.89 x 1.27 x 1.25 [FreeTextEntry2] : 0.60 [FreeTextEntry3] : 0.67 x 0.65 x 0.5 [de-identified] : Multiple subcentimeter thyroid cysts on the right

## 2019-05-22 LAB
25(OH)D3 SERPL-MCNC: 23.1 NG/ML
ABO + RH PNL BLD: NORMAL
ALBUMIN SERPL ELPH-MCNC: 4.5 G/DL
ALP BLD-CCNC: 75 U/L
ALT SERPL-CCNC: 15 U/L
ANION GAP SERPL CALC-SCNC: 11 MMOL/L
AST SERPL-CCNC: 17 U/L
BASOPHILS # BLD AUTO: 0.06 K/UL
BASOPHILS NFR BLD AUTO: 0.9 %
BILIRUB SERPL-MCNC: 0.5 MG/DL
BUN SERPL-MCNC: 14 MG/DL
CALCIUM SERPL-MCNC: 9.3 MG/DL
CHLORIDE SERPL-SCNC: 103 MMOL/L
CHOLEST SERPL-MCNC: 207 MG/DL
CHOLEST/HDLC SERPL: 3.3 RATIO
CO2 SERPL-SCNC: 25 MMOL/L
CREAT SERPL-MCNC: 0.93 MG/DL
EOSINOPHIL # BLD AUTO: 0.09 K/UL
EOSINOPHIL NFR BLD AUTO: 1.4 %
ESTIMATED AVERAGE GLUCOSE: 108 MG/DL
GLUCOSE SERPL-MCNC: 100 MG/DL
HBA1C MFR BLD HPLC: 5.4 %
HCT VFR BLD CALC: 41.8 %
HDLC SERPL-MCNC: 62 MG/DL
HGB BLD-MCNC: 13.3 G/DL
IMM GRANULOCYTES NFR BLD AUTO: 0.3 %
LDLC SERPL CALC-MCNC: 127 MG/DL
LYMPHOCYTES # BLD AUTO: 1.55 K/UL
LYMPHOCYTES NFR BLD AUTO: 23.4 %
MAN DIFF?: NORMAL
MCHC RBC-ENTMCNC: 28 PG
MCHC RBC-ENTMCNC: 31.8 GM/DL
MCV RBC AUTO: 88 FL
MONOCYTES # BLD AUTO: 0.32 K/UL
MONOCYTES NFR BLD AUTO: 4.8 %
NEUTROPHILS # BLD AUTO: 4.58 K/UL
NEUTROPHILS NFR BLD AUTO: 69.2 %
PLATELET # BLD AUTO: 279 K/UL
POTASSIUM SERPL-SCNC: 4 MMOL/L
PROT SERPL-MCNC: 6.4 G/DL
RBC # BLD: 4.75 M/UL
RBC # FLD: 13 %
SODIUM SERPL-SCNC: 139 MMOL/L
T4 FREE SERPL-MCNC: 1.2 NG/DL
TRIGL SERPL-MCNC: 92 MG/DL
TSH SERPL-ACNC: 1.39 UIU/ML
WBC # FLD AUTO: 6.62 K/UL

## 2019-07-11 ENCOUNTER — APPOINTMENT (OUTPATIENT)
Dept: RADIOLOGY | Facility: CLINIC | Age: 59
End: 2019-07-11

## 2019-08-06 ENCOUNTER — APPOINTMENT (OUTPATIENT)
Dept: RADIOLOGY | Facility: CLINIC | Age: 59
End: 2019-08-06
Payer: MEDICAID

## 2019-08-06 ENCOUNTER — APPOINTMENT (OUTPATIENT)
Dept: ULTRASOUND IMAGING | Facility: CLINIC | Age: 59
End: 2019-08-06
Payer: MEDICAID

## 2019-08-06 ENCOUNTER — OUTPATIENT (OUTPATIENT)
Dept: OUTPATIENT SERVICES | Facility: HOSPITAL | Age: 59
LOS: 1 days | End: 2019-08-06
Payer: MEDICAID

## 2019-08-06 DIAGNOSIS — Z98.89 OTHER SPECIFIED POSTPROCEDURAL STATES: Chronic | ICD-10-CM

## 2019-08-06 DIAGNOSIS — Z90.49 ACQUIRED ABSENCE OF OTHER SPECIFIED PARTS OF DIGESTIVE TRACT: Chronic | ICD-10-CM

## 2019-08-06 DIAGNOSIS — Z00.8 ENCOUNTER FOR OTHER GENERAL EXAMINATION: ICD-10-CM

## 2019-08-06 DIAGNOSIS — Z90.13 ACQUIRED ABSENCE OF BILATERAL BREASTS AND NIPPLES: Chronic | ICD-10-CM

## 2019-08-06 PROCEDURE — 76700 US EXAM ABDOM COMPLETE: CPT | Mod: 26

## 2019-08-06 PROCEDURE — 76700 US EXAM ABDOM COMPLETE: CPT

## 2020-01-29 ENCOUNTER — APPOINTMENT (OUTPATIENT)
Dept: OBGYN | Facility: CLINIC | Age: 60
End: 2020-01-29
Payer: MEDICAID

## 2020-01-29 VITALS
SYSTOLIC BLOOD PRESSURE: 121 MMHG | WEIGHT: 193 LBS | BODY MASS INDEX: 31.02 KG/M2 | HEIGHT: 66 IN | DIASTOLIC BLOOD PRESSURE: 84 MMHG | HEART RATE: 69 BPM

## 2020-01-29 PROCEDURE — 99396 PREV VISIT EST AGE 40-64: CPT

## 2020-02-06 LAB
BACTERIA UR CULT: NORMAL
CYTOLOGY CVX/VAG DOC THIN PREP: NORMAL
HPV HIGH+LOW RISK DNA PNL CVX: NOT DETECTED

## 2020-02-06 NOTE — HISTORY OF PRESENT ILLNESS
[Good] : being in good health [Last Bone Density ___] : Last bone density studies [unfilled] [Last Pap ___] : Last cervical pap smear was [unfilled] [Last Colonoscopy ___] : Last colonoscopy [unfilled]

## 2020-02-06 NOTE — PHYSICAL EXAM
[Alert] : alert [Awake] : awake [Acute Distress] : no acute distress [LAD] : no lymphadenopathy [Goiter] : no goiter [Thyroid Nodule] : no thyroid nodule [Soft] : soft [Tender] : non tender [Oriented x3] : oriented to person, place, and time [Normal] : uterus [No Bleeding] : there was no active vaginal bleeding [Uterine Adnexae] : were not tender and not enlarged

## 2020-02-18 ENCOUNTER — APPOINTMENT (OUTPATIENT)
Dept: ENDOCRINOLOGY | Facility: CLINIC | Age: 60
End: 2020-02-18

## 2020-06-23 ENCOUNTER — OUTPATIENT (OUTPATIENT)
Dept: OUTPATIENT SERVICES | Facility: HOSPITAL | Age: 60
LOS: 1 days | End: 2020-06-23

## 2020-06-23 DIAGNOSIS — Z98.89 OTHER SPECIFIED POSTPROCEDURAL STATES: Chronic | ICD-10-CM

## 2020-06-23 DIAGNOSIS — Z00.8 ENCOUNTER FOR OTHER GENERAL EXAMINATION: ICD-10-CM

## 2020-06-23 DIAGNOSIS — Z90.49 ACQUIRED ABSENCE OF OTHER SPECIFIED PARTS OF DIGESTIVE TRACT: Chronic | ICD-10-CM

## 2020-06-23 DIAGNOSIS — Z90.13 ACQUIRED ABSENCE OF BILATERAL BREASTS AND NIPPLES: Chronic | ICD-10-CM

## 2020-07-01 ENCOUNTER — OUTPATIENT (OUTPATIENT)
Dept: OUTPATIENT SERVICES | Facility: HOSPITAL | Age: 60
LOS: 1 days | End: 2020-07-01

## 2020-07-01 DIAGNOSIS — Z90.13 ACQUIRED ABSENCE OF BILATERAL BREASTS AND NIPPLES: Chronic | ICD-10-CM

## 2020-07-01 DIAGNOSIS — R14.0 ABDOMINAL DISTENSION (GASEOUS): ICD-10-CM

## 2020-07-01 DIAGNOSIS — R10.9 UNSPECIFIED ABDOMINAL PAIN: ICD-10-CM

## 2020-07-01 DIAGNOSIS — Z98.89 OTHER SPECIFIED POSTPROCEDURAL STATES: Chronic | ICD-10-CM

## 2020-07-01 DIAGNOSIS — Z90.49 ACQUIRED ABSENCE OF OTHER SPECIFIED PARTS OF DIGESTIVE TRACT: Chronic | ICD-10-CM

## 2020-07-15 ENCOUNTER — OUTPATIENT (OUTPATIENT)
Dept: OUTPATIENT SERVICES | Facility: HOSPITAL | Age: 60
LOS: 1 days | End: 2020-07-15

## 2020-07-15 DIAGNOSIS — R14.0 ABDOMINAL DISTENSION (GASEOUS): ICD-10-CM

## 2020-07-15 DIAGNOSIS — R10.9 UNSPECIFIED ABDOMINAL PAIN: ICD-10-CM

## 2020-07-15 DIAGNOSIS — Z90.49 ACQUIRED ABSENCE OF OTHER SPECIFIED PARTS OF DIGESTIVE TRACT: Chronic | ICD-10-CM

## 2020-07-15 DIAGNOSIS — Z98.89 OTHER SPECIFIED POSTPROCEDURAL STATES: Chronic | ICD-10-CM

## 2020-07-15 DIAGNOSIS — Z90.13 ACQUIRED ABSENCE OF BILATERAL BREASTS AND NIPPLES: Chronic | ICD-10-CM

## 2020-07-22 ENCOUNTER — OUTPATIENT (OUTPATIENT)
Dept: OUTPATIENT SERVICES | Facility: HOSPITAL | Age: 60
LOS: 1 days | End: 2020-07-22
Payer: MEDICAID

## 2020-07-22 ENCOUNTER — APPOINTMENT (OUTPATIENT)
Dept: ORTHOPEDIC SURGERY | Facility: CLINIC | Age: 60
End: 2020-07-22
Payer: MEDICAID

## 2020-07-22 ENCOUNTER — APPOINTMENT (OUTPATIENT)
Dept: ULTRASOUND IMAGING | Facility: CLINIC | Age: 60
End: 2020-07-22

## 2020-07-22 VITALS — TEMPERATURE: 97.4 F

## 2020-07-22 DIAGNOSIS — Z90.49 ACQUIRED ABSENCE OF OTHER SPECIFIED PARTS OF DIGESTIVE TRACT: Chronic | ICD-10-CM

## 2020-07-22 DIAGNOSIS — Z00.8 ENCOUNTER FOR OTHER GENERAL EXAMINATION: ICD-10-CM

## 2020-07-22 DIAGNOSIS — M25.561 PAIN IN RIGHT KNEE: ICD-10-CM

## 2020-07-22 DIAGNOSIS — Z98.89 OTHER SPECIFIED POSTPROCEDURAL STATES: Chronic | ICD-10-CM

## 2020-07-22 DIAGNOSIS — Z90.13 ACQUIRED ABSENCE OF BILATERAL BREASTS AND NIPPLES: Chronic | ICD-10-CM

## 2020-07-22 PROCEDURE — 20610 DRAIN/INJ JOINT/BURSA W/O US: CPT | Mod: RT

## 2020-07-22 PROCEDURE — 99204 OFFICE O/P NEW MOD 45 MIN: CPT | Mod: 25

## 2020-07-22 PROCEDURE — 76830 TRANSVAGINAL US NON-OB: CPT

## 2020-07-22 PROCEDURE — 76830 TRANSVAGINAL US NON-OB: CPT | Mod: 26

## 2020-07-22 PROCEDURE — 73564 X-RAY EXAM KNEE 4 OR MORE: CPT | Mod: RT

## 2020-07-28 ENCOUNTER — APPOINTMENT (OUTPATIENT)
Dept: OBGYN | Facility: CLINIC | Age: 60
End: 2020-07-28

## 2020-08-18 ENCOUNTER — APPOINTMENT (OUTPATIENT)
Dept: ENDOCRINOLOGY | Facility: CLINIC | Age: 60
End: 2020-08-18

## 2020-08-25 ENCOUNTER — APPOINTMENT (OUTPATIENT)
Dept: GASTROENTEROLOGY | Facility: CLINIC | Age: 60
End: 2020-08-25

## 2020-10-06 ENCOUNTER — APPOINTMENT (OUTPATIENT)
Dept: ENDOCRINOLOGY | Facility: CLINIC | Age: 60
End: 2020-10-06

## 2020-11-10 ENCOUNTER — NON-APPOINTMENT (OUTPATIENT)
Age: 60
End: 2020-11-10

## 2020-11-12 NOTE — ADDENDUM
[FreeTextEntry1] : 60 year old woman with thryoid noduled\par  - check tfts\par  - repeat thyroid ultrasound to monitor for change\par \par Vitamin D def'y - check vitamin D and adjust dose as needed

## 2020-11-12 NOTE — HISTORY OF PRESENT ILLNESS
[FreeTextEntry1] : cc: thyroid nodule\par \par 60 year old woman with thyroid nodules diagnosed in 2012.  ?FNA Nodules have been stable on serial ultrasound, last 5/2019.  \par She\par \par cold intolerance, constipation, fatigue.  palpitation, weight loss, tremor\par \par Also with vitamin D def'y - \par

## 2020-11-23 ENCOUNTER — APPOINTMENT (OUTPATIENT)
Dept: ENDOCRINOLOGY | Facility: CLINIC | Age: 60
End: 2020-11-23

## 2021-01-11 ENCOUNTER — APPOINTMENT (OUTPATIENT)
Dept: ENDOCRINOLOGY | Facility: CLINIC | Age: 61
End: 2021-01-11

## 2021-01-26 ENCOUNTER — APPOINTMENT (OUTPATIENT)
Dept: ORTHOPEDIC SURGERY | Facility: CLINIC | Age: 61
End: 2021-01-26

## 2021-02-02 ENCOUNTER — APPOINTMENT (OUTPATIENT)
Dept: OBGYN | Facility: CLINIC | Age: 61
End: 2021-02-02

## 2021-02-05 ENCOUNTER — APPOINTMENT (OUTPATIENT)
Dept: OBGYN | Facility: CLINIC | Age: 61
End: 2021-02-05
Payer: MEDICAID

## 2021-02-05 ENCOUNTER — APPOINTMENT (OUTPATIENT)
Dept: GASTROENTEROLOGY | Facility: CLINIC | Age: 61
End: 2021-02-05

## 2021-02-05 VITALS — HEIGHT: 66 IN | WEIGHT: 186 LBS | BODY MASS INDEX: 29.89 KG/M2

## 2021-02-05 DIAGNOSIS — Z01.419 ENCOUNTER FOR GYNECOLOGICAL EXAMINATION (GENERAL) (ROUTINE) W/OUT ABNORMAL FINDINGS: ICD-10-CM

## 2021-02-05 DIAGNOSIS — N95.2 POSTMENOPAUSAL ATROPHIC VAGINITIS: ICD-10-CM

## 2021-02-05 DIAGNOSIS — R39.9 UNSPECIFIED SYMPTOMS AND SIGNS INVOLVING THE GENITOURINARY SYSTEM: ICD-10-CM

## 2021-02-05 PROCEDURE — 99072 ADDL SUPL MATRL&STAF TM PHE: CPT

## 2021-02-05 PROCEDURE — 99396 PREV VISIT EST AGE 40-64: CPT

## 2021-02-10 ENCOUNTER — NON-APPOINTMENT (OUTPATIENT)
Age: 61
End: 2021-02-10

## 2021-02-11 ENCOUNTER — NON-APPOINTMENT (OUTPATIENT)
Age: 61
End: 2021-02-11

## 2021-02-18 LAB
BACTERIA UR CULT: NORMAL
CYTOLOGY CVX/VAG DOC THIN PREP: ABNORMAL
HPV HIGH+LOW RISK DNA PNL CVX: NOT DETECTED

## 2021-03-08 ENCOUNTER — APPOINTMENT (OUTPATIENT)
Dept: ENDOCRINOLOGY | Facility: CLINIC | Age: 61
End: 2021-03-08

## 2021-03-09 ENCOUNTER — LABORATORY RESULT (OUTPATIENT)
Age: 61
End: 2021-03-09

## 2021-03-09 ENCOUNTER — APPOINTMENT (OUTPATIENT)
Dept: ENDOCRINOLOGY | Facility: CLINIC | Age: 61
End: 2021-03-09
Payer: MEDICAID

## 2021-03-09 VITALS
TEMPERATURE: 97.8 F | OXYGEN SATURATION: 97 % | DIASTOLIC BLOOD PRESSURE: 82 MMHG | WEIGHT: 186 LBS | SYSTOLIC BLOOD PRESSURE: 110 MMHG | HEART RATE: 86 BPM | BODY MASS INDEX: 29.89 KG/M2 | HEIGHT: 66 IN

## 2021-03-09 DIAGNOSIS — G47.00 INSOMNIA, UNSPECIFIED: ICD-10-CM

## 2021-03-09 DIAGNOSIS — E55.9 VITAMIN D DEFICIENCY, UNSPECIFIED: ICD-10-CM

## 2021-03-09 DIAGNOSIS — E04.2 NONTOXIC MULTINODULAR GOITER: ICD-10-CM

## 2021-03-09 PROCEDURE — 99072 ADDL SUPL MATRL&STAF TM PHE: CPT

## 2021-03-09 PROCEDURE — 99214 OFFICE O/P EST MOD 30 MIN: CPT

## 2021-03-10 PROBLEM — G47.00 INSOMNIA: Status: ACTIVE | Noted: 2021-03-10

## 2021-03-10 NOTE — ASSESSMENT
[FreeTextEntry1] : 61 year old woman with thyroid nodules, symptoms of hypothyroidism\par  - CHeck TFTs to assess thyroid function\par  - Check thyroid ultrasound to monitor for growth\par \par Insomnia - counseled pt on sleep hygiene\par \par Vitamin d def'y - continue supplementation\par \par f/u 6 months

## 2021-03-10 NOTE — PHYSICAL EXAM
[Alert] : alert [Healthy Appearance] : healthy appearance [No Acute Distress] : no acute distress [Normal Sclera/Conjunctiva] : normal sclera/conjunctiva [No Proptosis] : no proptosis [No Respiratory Distress] : no respiratory distress [Clear to Auscultation] : lungs were clear to auscultation bilaterally [Normal S1, S2] : normal S1 and S2 [Regular Rhythm] : with a regular rhythm [No Edema] : no peripheral edema [Normal Bowel Sounds] : normal bowel sounds [Not Tender] : non-tender [Soft] : abdomen soft [No Spinal Tenderness] : no spinal tenderness [No Involuntary Movements] : no involuntary movements were seen [Normal Strength/Tone] : muscle strength and tone were normal [Normal Reflexes] : deep tendon reflexes were 2+ and symmetric [No Tremors] : no tremors [Normal Affect] : the affect was normal [Normal Mood] : the mood was normal [de-identified] : isthmus nodule ~ 1cm

## 2021-03-10 NOTE — HISTORY OF PRESENT ILLNESS
[FreeTextEntry1] : cc: thyroid nodules\par \par 61 year old woman with thyroid nodules, previously seen by Dr Gee, transitioning care.  She had an FNA with benign findings several years ago.  Since then she has been monitored with ultrasounds and nodules have been stable.  Last ultrasound was about 2 years ago.  She has not noted any recent changes in her thyroid.   She has been feeling very tired recently.  No history of hypothyroidism.  Also feeling cold, had gained some weight, though has more recently lost about 10 pounds.\par \par Vitamin D def'y - taking supplements

## 2021-03-17 LAB
25(OH)D3 SERPL-MCNC: 28.1 NG/ML
T3RU NFR SERPL: 1.1 TBI
T4 SERPL-MCNC: 8.4 UG/DL
TSH SERPL-ACNC: 1.29 UIU/ML

## 2021-03-19 PROBLEM — Z00.00 ENCOUNTER FOR PREVENTIVE HEALTH EXAMINATION: Noted: 2021-03-19

## 2021-03-25 ENCOUNTER — APPOINTMENT (OUTPATIENT)
Dept: ENDOCRINOLOGY | Facility: CLINIC | Age: 61
End: 2021-03-25

## 2021-04-14 ENCOUNTER — APPOINTMENT (OUTPATIENT)
Dept: ORTHOPEDIC SURGERY | Facility: CLINIC | Age: 61
End: 2021-04-14
Payer: MEDICAID

## 2021-04-26 ENCOUNTER — APPOINTMENT (OUTPATIENT)
Dept: ULTRASOUND IMAGING | Facility: CLINIC | Age: 61
End: 2021-04-26
Payer: MEDICAID

## 2021-04-26 ENCOUNTER — RESULT REVIEW (OUTPATIENT)
Age: 61
End: 2021-04-26

## 2021-04-26 ENCOUNTER — OUTPATIENT (OUTPATIENT)
Dept: OUTPATIENT SERVICES | Facility: HOSPITAL | Age: 61
LOS: 1 days | End: 2021-04-26
Payer: MEDICAID

## 2021-04-26 ENCOUNTER — APPOINTMENT (OUTPATIENT)
Dept: ORTHOPEDIC SURGERY | Facility: CLINIC | Age: 61
End: 2021-04-26
Payer: MEDICAID

## 2021-04-26 VITALS
WEIGHT: 186 LBS | DIASTOLIC BLOOD PRESSURE: 76 MMHG | HEIGHT: 66 IN | BODY MASS INDEX: 29.89 KG/M2 | HEART RATE: 69 BPM | SYSTOLIC BLOOD PRESSURE: 117 MMHG

## 2021-04-26 DIAGNOSIS — R39.9 UNSPECIFIED SYMPTOMS AND SIGNS INVOLVING THE GENITOURINARY SYSTEM: ICD-10-CM

## 2021-04-26 DIAGNOSIS — Z98.89 OTHER SPECIFIED POSTPROCEDURAL STATES: Chronic | ICD-10-CM

## 2021-04-26 DIAGNOSIS — Z90.13 ACQUIRED ABSENCE OF BILATERAL BREASTS AND NIPPLES: Chronic | ICD-10-CM

## 2021-04-26 DIAGNOSIS — M21.41 FLAT FOOT [PES PLANUS] (ACQUIRED), RIGHT FOOT: ICD-10-CM

## 2021-04-26 DIAGNOSIS — Z98.890 OTHER SPECIFIED POSTPROCEDURAL STATES: ICD-10-CM

## 2021-04-26 DIAGNOSIS — G89.29 PAIN IN RIGHT KNEE: ICD-10-CM

## 2021-04-26 DIAGNOSIS — M21.42 FLAT FOOT [PES PLANUS] (ACQUIRED), RIGHT FOOT: ICD-10-CM

## 2021-04-26 DIAGNOSIS — M25.561 PAIN IN RIGHT KNEE: ICD-10-CM

## 2021-04-26 DIAGNOSIS — M17.11 UNILATERAL PRIMARY OSTEOARTHRITIS, RIGHT KNEE: ICD-10-CM

## 2021-04-26 DIAGNOSIS — M71.21 SYNOVIAL CYST OF POPLITEAL SPACE [BAKER], RIGHT KNEE: ICD-10-CM

## 2021-04-26 DIAGNOSIS — Z00.8 ENCOUNTER FOR OTHER GENERAL EXAMINATION: ICD-10-CM

## 2021-04-26 DIAGNOSIS — M25.562 PAIN IN RIGHT KNEE: ICD-10-CM

## 2021-04-26 DIAGNOSIS — Z90.49 ACQUIRED ABSENCE OF OTHER SPECIFIED PARTS OF DIGESTIVE TRACT: Chronic | ICD-10-CM

## 2021-04-26 PROCEDURE — 76830 TRANSVAGINAL US NON-OB: CPT | Mod: 26

## 2021-04-26 PROCEDURE — 99072 ADDL SUPL MATRL&STAF TM PHE: CPT

## 2021-04-26 PROCEDURE — 73562 X-RAY EXAM OF KNEE 3: CPT | Mod: 50

## 2021-04-26 PROCEDURE — 76536 US EXAM OF HEAD AND NECK: CPT

## 2021-04-26 PROCEDURE — 76536 US EXAM OF HEAD AND NECK: CPT | Mod: 26

## 2021-04-26 PROCEDURE — 99214 OFFICE O/P EST MOD 30 MIN: CPT

## 2021-04-26 PROCEDURE — 76830 TRANSVAGINAL US NON-OB: CPT

## 2021-04-30 ENCOUNTER — NON-APPOINTMENT (OUTPATIENT)
Age: 61
End: 2021-04-30

## 2021-05-04 ENCOUNTER — NON-APPOINTMENT (OUTPATIENT)
Age: 61
End: 2021-05-04

## 2021-05-17 ENCOUNTER — RX RENEWAL (OUTPATIENT)
Age: 61
End: 2021-05-17

## 2021-05-17 RX ORDER — MELOXICAM 15 MG/1
15 TABLET ORAL
Qty: 30 | Refills: 2 | Status: ACTIVE | COMMUNITY
Start: 2020-07-22 | End: 1900-01-01

## 2021-05-24 RX ORDER — HYALURONATE SODIUM, STABILIZED 88 MG/4 ML
88 SYRINGE (ML) INTRAARTICULAR
Qty: 1 | Refills: 0 | Status: ACTIVE | COMMUNITY
Start: 2021-05-24

## 2021-06-09 RX ORDER — HYALURONATE SODIUM, STABILIZED 88 MG/4 ML
88 SYRINGE (ML) INTRAARTICULAR
Qty: 1 | Refills: 0 | Status: ACTIVE | COMMUNITY
Start: 2021-06-09

## 2021-06-28 ENCOUNTER — APPOINTMENT (OUTPATIENT)
Dept: ORTHOPEDIC SURGERY | Facility: CLINIC | Age: 61
End: 2021-06-28
Payer: MEDICAID

## 2021-06-28 ENCOUNTER — APPOINTMENT (OUTPATIENT)
Dept: ORTHOPEDIC SURGERY | Facility: CLINIC | Age: 61
End: 2021-06-28

## 2021-06-28 DIAGNOSIS — M17.12 UNILATERAL PRIMARY OSTEOARTHRITIS, LEFT KNEE: ICD-10-CM

## 2021-06-28 PROCEDURE — 20610 DRAIN/INJ JOINT/BURSA W/O US: CPT | Mod: RT

## 2021-06-28 PROCEDURE — 99214 OFFICE O/P EST MOD 30 MIN: CPT | Mod: 25

## 2021-06-28 PROCEDURE — 99072 ADDL SUPL MATRL&STAF TM PHE: CPT

## 2021-07-02 PROBLEM — M17.12 PRIMARY OSTEOARTHRITIS OF LEFT KNEE: Status: ACTIVE | Noted: 2021-04-26

## 2021-07-06 ENCOUNTER — APPOINTMENT (OUTPATIENT)
Dept: ORTHOPEDIC SURGERY | Facility: CLINIC | Age: 61
End: 2021-07-06

## 2021-08-31 ENCOUNTER — APPOINTMENT (OUTPATIENT)
Dept: GASTROENTEROLOGY | Facility: CLINIC | Age: 61
End: 2021-08-31

## 2021-09-13 ENCOUNTER — APPOINTMENT (OUTPATIENT)
Dept: ENDOCRINOLOGY | Facility: CLINIC | Age: 61
End: 2021-09-13

## 2021-10-25 ENCOUNTER — APPOINTMENT (OUTPATIENT)
Dept: ORTHOPEDIC SURGERY | Facility: CLINIC | Age: 61
End: 2021-10-25

## 2021-11-12 ENCOUNTER — APPOINTMENT (OUTPATIENT)
Dept: ULTRASOUND IMAGING | Facility: CLINIC | Age: 61
End: 2021-11-12

## 2021-11-18 ENCOUNTER — NON-APPOINTMENT (OUTPATIENT)
Age: 61
End: 2021-11-18

## 2021-11-18 ENCOUNTER — APPOINTMENT (OUTPATIENT)
Dept: GASTROENTEROLOGY | Facility: CLINIC | Age: 61
End: 2021-11-18

## 2021-11-18 ENCOUNTER — APPOINTMENT (OUTPATIENT)
Dept: GASTROENTEROLOGY | Facility: CLINIC | Age: 61
End: 2021-11-18
Payer: MEDICAID

## 2021-11-18 VITALS
DIASTOLIC BLOOD PRESSURE: 80 MMHG | HEART RATE: 102 BPM | BODY MASS INDEX: 28.61 KG/M2 | TEMPERATURE: 98 F | SYSTOLIC BLOOD PRESSURE: 122 MMHG | HEIGHT: 66 IN | RESPIRATION RATE: 16 BRPM | OXYGEN SATURATION: 98 % | WEIGHT: 178 LBS

## 2021-11-18 DIAGNOSIS — R12 HEARTBURN: ICD-10-CM

## 2021-11-18 DIAGNOSIS — R13.10 DYSPHAGIA, UNSPECIFIED: ICD-10-CM

## 2021-11-18 PROCEDURE — 99204 OFFICE O/P NEW MOD 45 MIN: CPT

## 2021-11-18 NOTE — ASSESSMENT
[FreeTextEntry1] : Screening, average risk for colon cancer\par Reflux, dysphagia, cannot rule out esophagitis, neoplasm\par \par Plan\par Indications risks benefits and alternatives to both upper endoscopy and colonoscopy reviewed\par Patient agreeable to examination\par \par Patient referred by Dr. Wally Peng

## 2021-11-18 NOTE — HISTORY OF PRESENT ILLNESS
[de-identified] : 61-year-old female presents for evaluation prior to screening colonoscopy\par Last examination approximately 5 years ago, hyperplastic polypectomy\par Denies any interval lower GI complaints, no bleeding, no constipation\par Prior history notable for breast cancer\par Patient with intermittent reflux complaints, also rare episodes of dysphagia\par Using Tums as needed\par \par Denies history of coronary disease congestive heart failure or dysrhythmia 36.7

## 2021-11-18 NOTE — PHYSICAL EXAM
[General Appearance - Alert] : alert [General Appearance - In No Acute Distress] : in no acute distress [Sclera] : the sclera and conjunctiva were normal [PERRL With Normal Accommodation] : pupils were equal in size, round, and reactive to light [Extraocular Movements] : extraocular movements were intact [Outer Ear] : the ears and nose were normal in appearance [Oropharynx] : the oropharynx was normal [Neck Appearance] : the appearance of the neck was normal [Neck Cervical Mass (___cm)] : no neck mass was observed [Jugular Venous Distention Increased] : there was no jugular-venous distention [Thyroid Diffuse Enlargement] : the thyroid was not enlarged [Thyroid Nodule] : there were no palpable thyroid nodules [Auscultation Breath Sounds / Voice Sounds] : lungs were clear to auscultation bilaterally [Heart Rate And Rhythm] : heart rate was normal and rhythm regular [Heart Sounds] : normal S1 and S2 [Heart Sounds Gallop] : no gallops [Murmurs] : no murmurs [Heart Sounds Pericardial Friction Rub] : no pericardial rub [Edema] : there was no peripheral edema [Bowel Sounds] : normal bowel sounds [Abdomen Soft] : soft [Abdomen Tenderness] : non-tender [Abdomen Mass (___ Cm)] : no abdominal mass palpated [FreeTextEntry1] : well-healed scar [Cervical Lymph Nodes Enlarged Posterior Bilaterally] : posterior cervical [Cervical Lymph Nodes Enlarged Anterior Bilaterally] : anterior cervical [Supraclavicular Lymph Nodes Enlarged Bilaterally] : supraclavicular [Axillary Lymph Nodes Enlarged Bilaterally] : axillary [Femoral Lymph Nodes Enlarged Bilaterally] : femoral [Inguinal Lymph Nodes Enlarged Bilaterally] : inguinal [No CVA Tenderness] : no ~M costovertebral angle tenderness [No Spinal Tenderness] : no spinal tenderness [Abnormal Walk] : normal gait [Nail Clubbing] : no clubbing  or cyanosis of the fingernails [Musculoskeletal - Swelling] : no joint swelling seen [Motor Tone] : muscle strength and tone were normal [Skin Color & Pigmentation] : normal skin color and pigmentation [Skin Turgor] : normal skin turgor [] : no rash [Oriented To Time, Place, And Person] : oriented to person, place, and time [Impaired Insight] : insight and judgment were intact [Affect] : the affect was normal

## 2021-11-19 DIAGNOSIS — Z12.11 ENCOUNTER FOR SCREENING FOR MALIGNANT NEOPLASM OF COLON: ICD-10-CM

## 2021-11-19 RX ORDER — SODIUM SULFATE, POTASSIUM SULFATE, MAGNESIUM SULFATE 17.5; 3.13; 1.6 G/ML; G/ML; G/ML
17.5-3.13-1.6 SOLUTION, CONCENTRATE ORAL
Qty: 1 | Refills: 0 | Status: ACTIVE | COMMUNITY
Start: 2021-11-19 | End: 1900-01-01

## 2021-12-12 DIAGNOSIS — Z01.818 ENCOUNTER FOR OTHER PREPROCEDURAL EXAMINATION: ICD-10-CM

## 2021-12-13 ENCOUNTER — APPOINTMENT (OUTPATIENT)
Dept: DISASTER EMERGENCY | Facility: CLINIC | Age: 61
End: 2021-12-13

## 2021-12-15 ENCOUNTER — APPOINTMENT (OUTPATIENT)
Dept: GASTROENTEROLOGY | Facility: AMBULATORY MEDICAL SERVICES | Age: 61
End: 2021-12-15
Payer: MEDICAID

## 2021-12-15 DIAGNOSIS — K64.9 UNSPECIFIED HEMORRHOIDS: ICD-10-CM

## 2021-12-15 LAB — SARS-COV-2 N GENE NPH QL NAA+PROBE: NOT DETECTED

## 2021-12-15 PROCEDURE — 45385 COLONOSCOPY W/LESION REMOVAL: CPT | Mod: 33

## 2021-12-15 PROCEDURE — 43239 EGD BIOPSY SINGLE/MULTIPLE: CPT

## 2021-12-21 ENCOUNTER — NON-APPOINTMENT (OUTPATIENT)
Age: 61
End: 2021-12-21

## 2022-02-08 ENCOUNTER — APPOINTMENT (OUTPATIENT)
Dept: ULTRASOUND IMAGING | Facility: CLINIC | Age: 62
End: 2022-02-08

## 2022-02-15 ENCOUNTER — APPOINTMENT (OUTPATIENT)
Dept: ENDOCRINOLOGY | Facility: CLINIC | Age: 62
End: 2022-02-15

## 2022-03-10 ENCOUNTER — APPOINTMENT (OUTPATIENT)
Dept: OBGYN | Facility: CLINIC | Age: 62
End: 2022-03-10

## 2022-08-29 ENCOUNTER — NON-APPOINTMENT (OUTPATIENT)
Age: 62
End: 2022-08-29

## 2022-08-29 PROBLEM — K64.9 HEMORRHOID: Status: ACTIVE | Noted: 2022-08-29

## 2022-08-29 RX ORDER — HYDROCORTISONE 25 MG/G
2.5 CREAM TOPICAL TWICE DAILY
Qty: 1 | Refills: 0 | Status: ACTIVE | COMMUNITY
Start: 2022-08-29 | End: 1900-01-01

## 2025-02-24 ENCOUNTER — OUTPATIENT (OUTPATIENT)
Dept: OUTPATIENT SERVICES | Facility: HOSPITAL | Age: 65
LOS: 1 days | End: 2025-02-24
Payer: MEDICARE

## 2025-02-24 ENCOUNTER — APPOINTMENT (OUTPATIENT)
Dept: ULTRASOUND IMAGING | Facility: CLINIC | Age: 65
End: 2025-02-24
Payer: MEDICARE

## 2025-02-24 DIAGNOSIS — R22.2 LOCALIZED SWELLING, MASS AND LUMP, TRUNK: ICD-10-CM

## 2025-02-24 DIAGNOSIS — Z90.49 ACQUIRED ABSENCE OF OTHER SPECIFIED PARTS OF DIGESTIVE TRACT: Chronic | ICD-10-CM

## 2025-02-24 DIAGNOSIS — Z98.89 OTHER SPECIFIED POSTPROCEDURAL STATES: Chronic | ICD-10-CM

## 2025-02-24 DIAGNOSIS — Z90.13 ACQUIRED ABSENCE OF BILATERAL BREASTS AND NIPPLES: Chronic | ICD-10-CM

## 2025-02-24 PROCEDURE — 76882 US LMTD JT/FCL EVL NVASC XTR: CPT

## 2025-02-24 PROCEDURE — 76882 US LMTD JT/FCL EVL NVASC XTR: CPT | Mod: 26,LT

## 2025-03-10 ENCOUNTER — APPOINTMENT (OUTPATIENT)
Dept: GASTROENTEROLOGY | Facility: CLINIC | Age: 65
End: 2025-03-10
Payer: MEDICARE

## 2025-03-10 VITALS
BODY MASS INDEX: 27.64 KG/M2 | OXYGEN SATURATION: 97 % | TEMPERATURE: 97.1 F | HEART RATE: 64 BPM | SYSTOLIC BLOOD PRESSURE: 110 MMHG | WEIGHT: 172 LBS | HEIGHT: 66 IN | DIASTOLIC BLOOD PRESSURE: 74 MMHG

## 2025-03-10 DIAGNOSIS — Z86.0101 PERSONAL HISTORY OF ADENOMATOUS AND SERRATED COLON POLYPS: ICD-10-CM

## 2025-03-10 DIAGNOSIS — K59.00 CONSTIPATION, UNSPECIFIED: ICD-10-CM

## 2025-03-10 PROCEDURE — 99204 OFFICE O/P NEW MOD 45 MIN: CPT

## 2025-03-11 LAB
ANION GAP SERPL CALC-SCNC: 10 MMOL/L
BUN SERPL-MCNC: 14 MG/DL
CALCIUM SERPL-MCNC: 9.4 MG/DL
CHLORIDE SERPL-SCNC: 103 MMOL/L
CO2 SERPL-SCNC: 26 MMOL/L
CREAT SERPL-MCNC: 0.61 MG/DL
EGFRCR SERPLBLD CKD-EPI 2021: 99 ML/MIN/1.73M2
GLUCOSE SERPL-MCNC: 104 MG/DL
HCT VFR BLD CALC: 42 %
HGB BLD-MCNC: 13.6 G/DL
MCHC RBC-ENTMCNC: 28.2 PG
MCHC RBC-ENTMCNC: 32.4 G/DL
MCV RBC AUTO: 87 FL
PLATELET # BLD AUTO: 258 K/UL
POTASSIUM SERPL-SCNC: 4.1 MMOL/L
RBC # BLD: 4.83 M/UL
RBC # FLD: 13.2 %
SODIUM SERPL-SCNC: 139 MMOL/L
WBC # FLD AUTO: 6.6 K/UL

## 2025-03-13 ENCOUNTER — APPOINTMENT (OUTPATIENT)
Dept: GASTROENTEROLOGY | Facility: CLINIC | Age: 65
End: 2025-03-13
Payer: MEDICARE

## 2025-03-13 DIAGNOSIS — R19.4 CHANGE IN BOWEL HABIT: ICD-10-CM

## 2025-03-13 PROCEDURE — 99202 OFFICE O/P NEW SF 15 MIN: CPT | Mod: 2W

## 2025-03-13 RX ORDER — SODIUM SULFATE, POTASSIUM SULFATE AND MAGNESIUM SULFATE 1.6; 3.13; 17.5 G/177ML; G/177ML; G/177ML
17.5-3.13-1.6 SOLUTION ORAL
Qty: 2 | Refills: 0 | Status: ACTIVE | COMMUNITY
Start: 2025-03-10 | End: 1900-01-01

## 2025-04-07 ENCOUNTER — APPOINTMENT (OUTPATIENT)
Dept: GASTROENTEROLOGY | Facility: HOSPITAL | Age: 65
End: 2025-04-07

## 2025-04-07 ENCOUNTER — RESULT REVIEW (OUTPATIENT)
Age: 65
End: 2025-04-07

## 2025-04-07 ENCOUNTER — OUTPATIENT (OUTPATIENT)
Dept: OUTPATIENT SERVICES | Facility: HOSPITAL | Age: 65
LOS: 1 days | End: 2025-04-07
Payer: MEDICARE

## 2025-04-07 ENCOUNTER — TRANSCRIPTION ENCOUNTER (OUTPATIENT)
Age: 65
End: 2025-04-07

## 2025-04-07 VITALS
HEART RATE: 63 BPM | DIASTOLIC BLOOD PRESSURE: 72 MMHG | OXYGEN SATURATION: 96 % | SYSTOLIC BLOOD PRESSURE: 146 MMHG | RESPIRATION RATE: 19 BRPM

## 2025-04-07 VITALS
WEIGHT: 169.09 LBS | OXYGEN SATURATION: 97 % | SYSTOLIC BLOOD PRESSURE: 128 MMHG | HEART RATE: 76 BPM | HEIGHT: 66 IN | RESPIRATION RATE: 16 BRPM | DIASTOLIC BLOOD PRESSURE: 71 MMHG | TEMPERATURE: 97 F

## 2025-04-07 DIAGNOSIS — Z90.13 ACQUIRED ABSENCE OF BILATERAL BREASTS AND NIPPLES: Chronic | ICD-10-CM

## 2025-04-07 DIAGNOSIS — Z90.49 ACQUIRED ABSENCE OF OTHER SPECIFIED PARTS OF DIGESTIVE TRACT: Chronic | ICD-10-CM

## 2025-04-07 DIAGNOSIS — Z86.0100 PERSONAL HISTORY OF COLON POLYPS, UNSPECIFIED: ICD-10-CM

## 2025-04-07 DIAGNOSIS — Z98.89 OTHER SPECIFIED POSTPROCEDURAL STATES: Chronic | ICD-10-CM

## 2025-04-07 DIAGNOSIS — R19.4 CHANGE IN BOWEL HABIT: ICD-10-CM

## 2025-04-07 PROCEDURE — 45385 COLONOSCOPY W/LESION REMOVAL: CPT | Mod: PT

## 2025-04-07 PROCEDURE — 43235 EGD DIAGNOSTIC BRUSH WASH: CPT

## 2025-04-07 PROCEDURE — 88305 TISSUE EXAM BY PATHOLOGIST: CPT | Mod: 26

## 2025-04-07 PROCEDURE — 88305 TISSUE EXAM BY PATHOLOGIST: CPT

## 2025-04-07 PROCEDURE — 45385 COLONOSCOPY W/LESION REMOVAL: CPT

## 2025-04-07 DEVICE — NET RETRV ROT ROTH 2.5MMX230CM: Type: IMPLANTABLE DEVICE | Status: FUNCTIONAL

## 2025-04-07 RX ORDER — LATANOPROST PF 0.05 MG/ML
0 SOLUTION/ DROPS OPHTHALMIC
Refills: 0 | DISCHARGE

## 2025-04-07 NOTE — ASU PATIENT PROFILE, ADULT - NSICDXPASTMEDICALHX_GEN_ALL_CORE_FT
PAST MEDICAL HISTORY:  Breast cancer left breast DCIS 2007    Goiter 10years    Hiatal hernia     Kidney stone     Mechanical complication due to breast prosthesis, initial encounter     Migraine     Mitral valve prolapse     Other acute appendicitis     Rupture of anterior cruciate ligament of left knee, initial encounter

## 2025-04-07 NOTE — ASU DISCHARGE PLAN (ADULT/PEDIATRIC) - FINANCIAL ASSISTANCE
[facility] provides services at a reduced cost to those who are determined to be eligible through [facility]’s financial assistance program. Information regarding [facility]’s financial assistance program can be found by going to [email or web address] or by calling [phone].

## 2025-04-07 NOTE — PRE PROCEDURE NOTE - PRE PROCEDURE EVALUATION
Attending Physician:        Santos Gan MD                    Procedure:    Indication for Procedure: history of colon polyps  ________________________________________________________  PAST MEDICAL & SURGICAL HISTORY:  Breast cancer  left breast DCIS 2007      Mitral valve prolapse      Migraine      Kidney stone      Goiter  10years      Hiatal hernia      Other acute appendicitis      Rupture of anterior cruciate ligament of left knee, initial encounter      Mechanical complication due to breast prosthesis, initial encounter      History of appendectomy  1981      S/P ACL repair  left knee 2002      S/P mastectomy, bilateral  s/p Implants b/l 2007        ALLERGIES:  No Known Drug Allergies  dust (Sneezing)    HOME MEDICATIONS:    AICD/PPM: [ ] yes   [ ] no    PERTINENT LAB DATA:                      PHYSICAL EXAMINATION:    T(C): --  HR: --  BP: --  RR: --  SpO2: --    Constitutional: NAD  HEENT: PERRLA, EOMI,    Neck:  No JVD  Respiratory: CTAB/L  Cardiovascular: S1 and S2  Gastrointestinal: BS+, soft, NT/ND  Extremities: No peripheral edema  Neurological: A/O x 3, no focal deficits  Psychiatric: Normal mood, normal affect  Skin: No rashes    ASA Class: I [ ]  II [ ]  III [ ]  IV [ ]    COMMENTS:    The patient is a suitable candidate for the planned procedure unless box checked [ ]  No, explain:

## 2025-04-07 NOTE — PRE-ANESTHESIA EVALUATION ADULT - NSANTHAPLANRD_GEN_ALL_CORE
monitored anesthesia care (MAC) major surgery, including arthroscopic and laparoscopic (greater than 1 hr)

## 2025-04-07 NOTE — ASU DISCHARGE PLAN (ADULT/PEDIATRIC) - DISCHARGE PLAN IS COMPLETE AND GIVEN TO PATIENT
LOV 9/30/20 with PCP   Recently treated with Macrobid, per OV note (on 9/12/20)    They called me last Sat to say the UA was free of bacteria. On Sun, I had some ABD pain, which I ignored. Monday, I had some pain. And Tuesday, the whole hour while waiting to reg to vote, I had an abd pain. Last night, I had so much abd pain, I had to take my tramadol - that took care of it. This morning, I had nausea. I took a nausea pill. Now today, I have periodic pain.     Pain in L abd side - near hipbone. Sometimes, throughout my abd. Crampy pain. Pain rated 5 generally, po when it hits me it is up to 8 by then.     No hematuria. No burning sensation any more. I am able to urinate. I'm drinking water and juices, mostly cranberry.   No burning, no frequency.   Comfortable once she empties bladder and ok for the next 3 hours.     No diarrhea.     Advised [facility] for further assessment and treatment. She is unsure if she can get a ride. Pharmacy verified     Please advise            : Yes

## 2025-04-07 NOTE — ASU PATIENT PROFILE, ADULT - NSICDXPASTSURGICALHX_GEN_ALL_CORE_FT
PAST SURGICAL HISTORY:  History of appendectomy 1981    S/P ACL repair left knee 2002    S/P mastectomy, bilateral s/p Implants b/l 2007

## 2025-04-10 LAB — SURGICAL PATHOLOGY STUDY: SIGNIFICANT CHANGE UP

## 2025-04-11 ENCOUNTER — NON-APPOINTMENT (OUTPATIENT)
Age: 65
End: 2025-04-11

## 2025-07-10 RX ORDER — FAMOTIDINE 40 MG/1
40 TABLET, FILM COATED ORAL
Qty: 60 | Refills: 0 | Status: ACTIVE | COMMUNITY
Start: 2025-07-10 | End: 1900-01-01

## 2025-09-11 ENCOUNTER — APPOINTMENT (OUTPATIENT)
Facility: CLINIC | Age: 65
End: 2025-09-11

## (undated) DEVICE — BIOPSY FORCEP RADIAL JAW 4 STANDARD WITH NEEDLE

## (undated) DEVICE — BALLOON US ENDO

## (undated) DEVICE — TUBING IV SET GRAVITY 3Y 100" MACRO

## (undated) DEVICE — SYR ALLIANCE II INFLATION 60ML

## (undated) DEVICE — SYR LUER LOK 50CC

## (undated) DEVICE — PACK IV START WITH CHG

## (undated) DEVICE — SENSOR O2 FINGER ADULT

## (undated) DEVICE — POLY TRAP ETRAP

## (undated) DEVICE — FORCEP RADIAL JAW 4 JUMBO 2.8MM 3.2MM 240CM ORANGE DISP

## (undated) DEVICE — CLAMP BX HOT RAD JAW 3

## (undated) DEVICE — ELCTR GROUNDING PAD ADULT COVIDIEN

## (undated) DEVICE — SUCTION YANKAUER NO CONTROL VENT

## (undated) DEVICE — CATH IV SAFE BC 22G X 1" (BLUE)

## (undated) DEVICE — BRUSH COLONOSCOPY CYTOLOGY

## (undated) DEVICE — TUBING SUCTION 20FT

## (undated) DEVICE — TUBING SUCTION CONN 6FT STERILE

## (undated) DEVICE — IRRIGATOR BIO SHIELD

## (undated) DEVICE — SOL INJ NS 0.9% 500ML 2 PORT

## (undated) DEVICE — CATH IV SAFE BC 20G X 1.16" (PINK)

## (undated) DEVICE — BITE BLOCK ADULT 20 X 27MM (GREEN)

## (undated) DEVICE — ENDOCUFF VISION SZ 2 LG GRN

## (undated) DEVICE — FOLEY HOLDER STATLOCK 2 WAY ADULT